# Patient Record
Sex: MALE | Race: WHITE | NOT HISPANIC OR LATINO
[De-identification: names, ages, dates, MRNs, and addresses within clinical notes are randomized per-mention and may not be internally consistent; named-entity substitution may affect disease eponyms.]

---

## 2021-08-14 ENCOUNTER — APPOINTMENT (OUTPATIENT)
Age: 41
End: 2021-08-14
Payer: COMMERCIAL

## 2021-08-14 PROCEDURE — 0001A: CPT

## 2021-09-18 ENCOUNTER — APPOINTMENT (OUTPATIENT)
Age: 41
End: 2021-09-18

## 2022-04-05 PROBLEM — Z00.00 ENCOUNTER FOR PREVENTIVE HEALTH EXAMINATION: Status: ACTIVE | Noted: 2022-04-05

## 2022-05-05 ENCOUNTER — APPOINTMENT (OUTPATIENT)
Dept: PAIN MANAGEMENT | Facility: CLINIC | Age: 42
End: 2022-05-05

## 2022-05-10 ENCOUNTER — APPOINTMENT (OUTPATIENT)
Dept: PAIN MANAGEMENT | Facility: CLINIC | Age: 42
End: 2022-05-10
Payer: OTHER MISCELLANEOUS

## 2022-05-10 VITALS — HEIGHT: 72 IN | BODY MASS INDEX: 33.86 KG/M2 | WEIGHT: 250 LBS

## 2022-05-10 PROCEDURE — 99072 ADDL SUPL MATRL&STAF TM PHE: CPT

## 2022-05-10 PROCEDURE — 99213 OFFICE O/P EST LOW 20 MIN: CPT

## 2022-05-10 NOTE — HISTORY OF PRESENT ILLNESS
[Lower back] : lower back [10] : 10 [8] : 8 [Radiating] : radiating [] : yes [Constant] : constant [Standing] : standing [Walking] : walking [FreeTextEntry1] : 05/10/22 - Patient presents for a WC FUV. Patient reports he was indicated for spine surgery surgery with Dr. Abarca and plans to move forward on 05/31/22. Patient reports minimal benefit with Gabapentin. Patient reports GI intolerance with NSAIDs. Patient presents today requesting rx therapies to offer relief in the interim. \par \par 2/21/22 - Patient presents for a FUV following a Right L5-S1 TFESI on 02/02/22. Patient reports no relief in his symptoms following his last injection. Patient reports that he has reconvened with Dr. Abarca who suggested proceeding with another MRI. Patient reports that driving exacerbates his pain. Patient has been taking Tylenol, gabapentin and flexural, finds mild relief with this combination, also experiencing dry mouth. \par \par 1/6/22 - Patient presents for initial evaluation. 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 He cannot any one specific event however. Denies any previous low back issues prior to this time. Patient c/o pain in his lower back, travelling in the right lower extremity focused on the right outer calf and top of the right foot. Patient was attending PT; however, he has not been able to gain approval to continue PT. \par \par W/c DOI: 03/26/21\par Occupation: [employer] \par Working Status: Light Duty\par \par Previous Injections: \par 1) Right L5-S1 TFESI (02/02/22)\par \par Pertinent Surgical History: N/A\par \par Imaging:\par 1) NCV / EMG (11/30/21) - [facility] \par Right sided mild L5 radiculopathy with acute and chronic denervation changes in the respective muscles / myotome. \par \par 2) Lumbar Spine MRI (03/07/22) - [facility]\par At L1-2: No significant spinal canal or neural foraminal stenosis.\par At L2-3: No significant spinal canal or neural foraminal stenosis.\par At L3-4: Broad-based left paracentral/foraminal disc herniation narrowing the left subarticular recess and moderately narrowing the adjacent foramen. The right neural foramen is patent. There is no significant spinal canal stenosis.\par At L4-5: Broad-based left paracentral/foraminal disc herniation effacing the ventral thecal sac without signal spinal canal stenosis. There is bilateral facet arthrosis contributing to moderate left-sided neural foraminal narrowing\par with abutment of the exiting L4 nerve root. The right neural foramen is patent.\par At L5-S1: Disc bulge with a superimposed broad-based left paracentral/foraminal disc herniation narrowing the left subarticular recess and severely narrowing the left neural foramen with impingement of the exiting L5 nerve root. There is mild bilateral facet arthrosis. The right neural foramen is patent. There is a central zone posterior annular fissure.\par \par Physician Disclaimer: I have personally reviewed and confirmed all HPI data with the patient. [FreeTextEntry7] : Right lateral calf, dorsal aspect of foot and ankle.

## 2022-05-10 NOTE — WORK
[Does not reveal pre-existing condition(s) that may affect treatment/prognosis] : does not reveal pre-existing condition(s) that may affect treatment/prognosis [Patient] : patient [No Rx restrictions] : No Rx restrictions. [I provided the services listed above] :  I provided the services listed above. [Partial] : partial [FreeTextEntry1] : fair

## 2022-05-10 NOTE — PHYSICAL EXAM
[de-identified] : Constitutional: \par - No acute distress \par - Well developed; well nourished \par \par Neurological: \par - normal mood and affect \par - alert and oriented x 3  \par \par Cardiovascular: \par - grossly normal\par \par Lumbar Spine Exam: \par \par Inspection: \par erythema (-) \par ecchymosis (-) \par rashes (-) \par alignment: no scoliosis\par \par Palpation:  \par paraspinal tenderness:                  L (-) ; R (+) \par thoracic paraspinal tenderness:    L (-) ; R (-) \par sciatic nerve tenderness :             L (-) ; R (+) \par SI joint tenderness:                        L (-) ; R (-) \par GTB tenderness:                            L (-);  R (-) \par \par ROM:   \par Full ROM with mild stiffness   \par Pain with extension and flexion \par \par Strength:                   Right       Left    \par Hip Flexion:                (5/5)       (5/5) \par Quadriceps:               (5/5)       (5/5) \par Hamstrings:                (5/5)       (5/5) \par Ankle Dorsiflexion:     (5/5)       (5/5) \par EHL:                            (5/5)       (5/5) \par Ankle Plantarflexion:  (5/5)       (5/5) \par \par \par Special Tests: \par SLR:                      R (+) ; L (-) \par Facet loading:       R (-) ; L (-) \par [place] test:          R (-) ; L (-) \par XSLR:                   R (-) ; L (-) \par Sarmad's test:        R (-) ; L(-) \par Tight Hamstrings   R (-) ; L (-) \par \par Neurologic: \par Light touch intact throughout LE \par Reflexes normal and symmetric \par \par Gait: \par non- antalgic gait

## 2022-05-10 NOTE — ASSESSMENT
[FreeTextEntry1] : A thorough discussion occurred regarding available pain management treatment options including interventional, rehabilitative, pharmacological, and alternative modalities with the patient. We will proceed with the following:\par \par Interventional treatment options:\par - Patient proceeding with lumbar surgical intervention with Dr. Abarca. \par - None indicated at this time\par \par Rehabilitative options:\par - completed PT; transition to HEP as tolerated\par \par Medication based treatment options:\par - increase gabapentin 600 mg TID ; titration schedule provided \par - Hold NSAIDs due to increasing GERD symptoms \par - continue Tylenol 500-1000 mg TID PRN \par - see additional instructions below\par \par Complementary treatment options:\par - Weight management and lifestyle modifications discussed\par \par Additional treatment recommendations as follows:\par - Follow up with Dr. Abarca as directed\par - follow up on PRN basis \par \par The documentation recorded by the scribe, in my presence, accurately reflects the service I personally performed and the decisions made by me with my edits as appropriate.\par \par I, Jessica Barbosa acting as scribe, attest that this documentation has been prepared under the direction and in the presence of Provider Roel Toribio DO.

## 2022-05-13 RX ORDER — CYCLOBENZAPRINE HYDROCHLORIDE 5 MG/1
5 TABLET, FILM COATED ORAL DAILY
Qty: 30 | Refills: 1 | Status: ACTIVE | COMMUNITY
Start: 2022-05-13 | End: 1900-01-01

## 2022-05-24 ENCOUNTER — APPOINTMENT (OUTPATIENT)
Dept: PHYSICAL MEDICINE AND REHAB | Facility: CLINIC | Age: 42
End: 2022-05-24
Payer: OTHER MISCELLANEOUS

## 2022-05-24 VITALS
BODY MASS INDEX: 33.86 KG/M2 | WEIGHT: 250 LBS | DIASTOLIC BLOOD PRESSURE: 88 MMHG | HEART RATE: 89 BPM | OXYGEN SATURATION: 100 % | TEMPERATURE: 98.1 F | SYSTOLIC BLOOD PRESSURE: 126 MMHG | HEIGHT: 72 IN | RESPIRATION RATE: 16 BRPM

## 2022-05-24 DIAGNOSIS — Z78.9 OTHER SPECIFIED HEALTH STATUS: ICD-10-CM

## 2022-05-24 DIAGNOSIS — K29.60 OTHER GASTRITIS W/OUT BLEEDING: ICD-10-CM

## 2022-05-24 DIAGNOSIS — Z80.0 FAMILY HISTORY OF MALIGNANT NEOPLASM OF DIGESTIVE ORGANS: ICD-10-CM

## 2022-05-24 PROCEDURE — 99072 ADDL SUPL MATRL&STAF TM PHE: CPT

## 2022-05-24 PROCEDURE — 99244 OFF/OP CNSLTJ NEW/EST MOD 40: CPT | Mod: NP,25

## 2022-05-24 PROCEDURE — 93000 ELECTROCARDIOGRAM COMPLETE: CPT

## 2022-05-24 RX ORDER — LORATADINE 10 MG
10 TABLET,CHEWABLE ORAL
Refills: 0 | Status: ACTIVE | COMMUNITY

## 2022-05-24 RX ORDER — MULTI VITAMIN WITH FLUORIDE .5; 2500; 24; 36; 400; 15; 1.05; 1.2; 13.5; 1.05; .3; 4.5 MG/1; [IU]/1; MG/1; MG/1; [IU]/1; [IU]/1; MG/1; MG/1; MG/1; MG/1; MG/1; UG/1
TABLET, CHEWABLE ORAL
Refills: 0 | Status: ACTIVE | COMMUNITY

## 2022-05-26 ENCOUNTER — FORM ENCOUNTER (OUTPATIENT)
Age: 42
End: 2022-05-26

## 2022-05-29 ENCOUNTER — TRANSCRIPTION ENCOUNTER (OUTPATIENT)
Age: 42
End: 2022-05-29

## 2022-05-31 ENCOUNTER — APPOINTMENT (OUTPATIENT)
Dept: ORTHOPEDIC SURGERY | Facility: HOSPITAL | Age: 42
End: 2022-05-31

## 2022-06-17 ENCOUNTER — APPOINTMENT (OUTPATIENT)
Dept: ORTHOPEDIC SURGERY | Facility: HOSPITAL | Age: 42
End: 2022-06-17

## 2022-06-20 RX ORDER — OXYCODONE AND ACETAMINOPHEN 5; 325 MG/1; MG/1
5-325 TABLET ORAL EVERY 6 HOURS
Qty: 28 | Refills: 0 | Status: ACTIVE | COMMUNITY
Start: 2022-06-20 | End: 1900-01-01

## 2022-06-24 RX ORDER — HYDROCODONE BITARTRATE AND ACETAMINOPHEN 5; 325 MG/1; MG/1
5-325 TABLET ORAL
Qty: 30 | Refills: 0 | Status: ACTIVE | COMMUNITY
Start: 2022-06-24 | End: 1900-01-01

## 2022-06-27 ENCOUNTER — FORM ENCOUNTER (OUTPATIENT)
Age: 42
End: 2022-06-27

## 2022-06-29 ENCOUNTER — FORM ENCOUNTER (OUTPATIENT)
Age: 42
End: 2022-06-29

## 2022-06-29 ENCOUNTER — APPOINTMENT (OUTPATIENT)
Dept: ORTHOPEDIC SURGERY | Facility: CLINIC | Age: 42
End: 2022-06-29
Payer: OTHER MISCELLANEOUS

## 2022-06-29 VITALS — WEIGHT: 250 LBS | HEIGHT: 72 IN | BODY MASS INDEX: 33.86 KG/M2

## 2022-06-29 PROCEDURE — 99024 POSTOP FOLLOW-UP VISIT: CPT

## 2022-06-29 RX ORDER — METHYLPREDNISOLONE 4 MG/1
4 TABLET ORAL
Qty: 1 | Refills: 0 | Status: ACTIVE | COMMUNITY
Start: 2022-06-29 | End: 1900-01-01

## 2022-06-29 RX ORDER — OXYCODONE AND ACETAMINOPHEN 5; 325 MG/1; MG/1
5-325 TABLET ORAL
Qty: 30 | Refills: 0 | Status: ACTIVE | COMMUNITY
Start: 2022-06-29 | End: 1900-01-01

## 2022-06-29 NOTE — HISTORY OF PRESENT ILLNESS
[de-identified] : S/P Laminectomy L4-5, L5-S1 Bilateral Foraminotomy 6/17/22. Denies fevers, chiills, SOB. Pain is 7/10. Taking 2 Percocet a day QHS and one during the day, Gabapentin 600 mg TID, Flexeril 5 mg TID, and Tylenol. Patient reports that he still has pain in bilateral legs, most notably in anterior right shin and top of right foot.

## 2022-06-29 NOTE — REASON FOR VISIT
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

## 2022-06-29 NOTE — ASSESSMENT
[FreeTextEntry1] : Rx medrol dose santana\par Rx Percocet 5-325 mg q6 hrs PRN for pain\par Will defer PT at this time\par Perform stretching at home at this time\par Recommend: - NSAID - Heating pad - Muscle relaxer - Core strengthening exercise - Hamstring stretching exercise Patient is given back rehabilitation exercise book.\par

## 2022-06-30 ENCOUNTER — FORM ENCOUNTER (OUTPATIENT)
Age: 42
End: 2022-06-30

## 2022-07-04 ENCOUNTER — FORM ENCOUNTER (OUTPATIENT)
Age: 42
End: 2022-07-04

## 2022-07-05 RX ORDER — CYCLOBENZAPRINE HYDROCHLORIDE 5 MG/1
5 TABLET, FILM COATED ORAL 3 TIMES DAILY
Qty: 90 | Refills: 2 | Status: ACTIVE | COMMUNITY
Start: 2022-07-05 | End: 1900-01-01

## 2022-07-06 ENCOUNTER — FORM ENCOUNTER (OUTPATIENT)
Age: 42
End: 2022-07-06

## 2022-07-07 ENCOUNTER — FORM ENCOUNTER (OUTPATIENT)
Age: 42
End: 2022-07-07

## 2022-07-13 RX ORDER — METHYLPREDNISOLONE 4 MG/1
4 TABLET ORAL
Qty: 1 | Refills: 0 | Status: ACTIVE | COMMUNITY
Start: 2022-07-13 | End: 1900-01-01

## 2022-07-24 ENCOUNTER — FORM ENCOUNTER (OUTPATIENT)
Age: 42
End: 2022-07-24

## 2022-07-25 RX ORDER — GABAPENTIN 600 MG/1
600 TABLET, COATED ORAL 3 TIMES DAILY
Qty: 90 | Refills: 0 | Status: ACTIVE | COMMUNITY
Start: 2022-07-25 | End: 1900-01-01

## 2022-07-26 ENCOUNTER — FORM ENCOUNTER (OUTPATIENT)
Age: 42
End: 2022-07-26

## 2022-07-28 ENCOUNTER — FORM ENCOUNTER (OUTPATIENT)
Age: 42
End: 2022-07-28

## 2022-08-03 ENCOUNTER — APPOINTMENT (OUTPATIENT)
Dept: ORTHOPEDIC SURGERY | Facility: CLINIC | Age: 42
End: 2022-08-03

## 2022-08-03 VITALS — WEIGHT: 250 LBS | BODY MASS INDEX: 33.86 KG/M2 | HEIGHT: 72 IN

## 2022-08-03 PROCEDURE — 99024 POSTOP FOLLOW-UP VISIT: CPT

## 2022-08-03 PROCEDURE — 72100 X-RAY EXAM L-S SPINE 2/3 VWS: CPT

## 2022-08-07 ENCOUNTER — FORM ENCOUNTER (OUTPATIENT)
Age: 42
End: 2022-08-07

## 2022-08-11 NOTE — ASSESSMENT
[FreeTextEntry1] : Patient will begin physical therapy. \par \par Recommend: - NSAID - Heating pad - Muscle relaxer - Core strengthening exercise - Hamstring stretching exercise Patient is given back rehabilitation exercise book. \par \par Begin Mobic PRN\par \par Patient given RX for LSO brace\par \par Follow up in 1 month \par

## 2022-08-11 NOTE — HISTORY OF PRESENT ILLNESS
[de-identified] : S/P Laminectomy L4-5, L5-S1 Bilateral Foraminotomy 6/17/22. Experiencing constant back. Unable to sit, stand, or walk for long than 30 minutes. Finished Medrol Pack. Taking 2 Percocet BID, Gabapentin 600 mg TID, Flexeril 5 mg TID, and Tylenol.

## 2022-08-26 ENCOUNTER — APPOINTMENT (OUTPATIENT)
Dept: ORTHOPEDIC SURGERY | Facility: CLINIC | Age: 42
End: 2022-08-26

## 2022-08-26 VITALS — WEIGHT: 250 LBS | HEIGHT: 72 IN | BODY MASS INDEX: 33.86 KG/M2

## 2022-08-26 PROCEDURE — 99024 POSTOP FOLLOW-UP VISIT: CPT

## 2022-08-26 NOTE — ASSESSMENT
[FreeTextEntry1] : Patient started PT today. \par \par Was unable to get brace yet due to payment issues from .  Gave patient phone number for [facility].\par \par Worker's compensation 50% temporarily disabled \par \par Recommend: - NSAID - Heating pad - Muscle relaxer - Core strengthening exercise - Hamstring stretching exercise Patient is given back rehabilitation exercise book.

## 2022-08-26 NOTE — HISTORY OF PRESENT ILLNESS
[de-identified] : S/P Laminectomy L4-5, L5-S1 Bilateral Foraminotomy 6/17/22. Still experiencing constant pain. Started PT today. Taking 2 Percocet, Gabapentin 600 mg TID, Flexeril 5 mg TID, Meloxicam, and Tylenol. Unsuccessful at finding a place to get the LSO brace.

## 2022-08-30 RX ORDER — CYCLOBENZAPRINE HYDROCHLORIDE 10 MG/1
10 TABLET, FILM COATED ORAL 3 TIMES DAILY
Qty: 90 | Refills: 2 | Status: ACTIVE | COMMUNITY
Start: 2022-07-13 | End: 1900-01-01

## 2022-08-31 ENCOUNTER — FORM ENCOUNTER (OUTPATIENT)
Age: 42
End: 2022-08-31

## 2022-09-07 ENCOUNTER — FORM ENCOUNTER (OUTPATIENT)
Age: 42
End: 2022-09-07

## 2022-09-21 ENCOUNTER — FORM ENCOUNTER (OUTPATIENT)
Age: 42
End: 2022-09-21

## 2022-09-21 ENCOUNTER — APPOINTMENT (OUTPATIENT)
Dept: ORTHOPEDIC SURGERY | Facility: CLINIC | Age: 42
End: 2022-09-21

## 2022-09-21 PROCEDURE — 99072 ADDL SUPL MATRL&STAF TM PHE: CPT

## 2022-09-21 PROCEDURE — 99215 OFFICE O/P EST HI 40 MIN: CPT

## 2022-09-21 NOTE — HISTORY OF PRESENT ILLNESS
[de-identified] : S/P Laminectomy L4-5, L5-S1 Bilateral Foraminotomy 6/17/22. \par Patient reports increased pain in back and legs since last visit\par Reports going to PT 2-3x/week (10 sessions) with no improvement. \par Pt states "each day is worse than the day before" \par Reports taking Gabapentin, Mobic, Flexeril, Tylenol and Percocet with slight relief \par Pt received LSO brace- reports feeling stabilized when walking but no improvement in pain.

## 2022-09-21 NOTE — ASSESSMENT
[FreeTextEntry1] : Patient given prescription for MRI, follow up after study is completed to discuss results. \par \par Continue using LSO brace PRN\par \par Recommend: - NSAID - Heating pad - Muscle relaxer - Core strengthening exercise - Hamstring stretching exercise Patient is given back rehabilitation exercise book. \par \par Patient will go to PT tomorrow.  If having increased pain will hold until MRI is performed. \par \par Worker's compensation 50% temporarily disabled

## 2022-09-22 ENCOUNTER — FORM ENCOUNTER (OUTPATIENT)
Age: 42
End: 2022-09-22

## 2022-09-26 RX ORDER — CYCLOBENZAPRINE HYDROCHLORIDE 5 MG/1
5 TABLET, FILM COATED ORAL
Qty: 180 | Refills: 1 | Status: ACTIVE | COMMUNITY
Start: 2022-06-27 | End: 1900-01-01

## 2022-10-05 ENCOUNTER — FORM ENCOUNTER (OUTPATIENT)
Age: 42
End: 2022-10-05

## 2022-10-13 ENCOUNTER — APPOINTMENT (OUTPATIENT)
Dept: ORTHOPEDIC SURGERY | Facility: CLINIC | Age: 42
End: 2022-10-13

## 2022-10-13 VITALS — HEIGHT: 72 IN | WEIGHT: 250 LBS | BODY MASS INDEX: 33.86 KG/M2

## 2022-10-13 PROCEDURE — 99215 OFFICE O/P EST HI 40 MIN: CPT

## 2022-10-13 PROCEDURE — 72100 X-RAY EXAM L-S SPINE 2/3 VWS: CPT

## 2022-10-13 PROCEDURE — 99072 ADDL SUPL MATRL&STAF TM PHE: CPT

## 2022-10-13 NOTE — ASSESSMENT
[FreeTextEntry1] : Patient is scheduled for lumbar MRI with contrast tomorrow. \par \par Continue using LSO brace PRN\par \par Recommend: - NSAID - Heating pad - Muscle relaxer - Core strengthening exercise - Hamstring stretching exercise Patient is given back rehabilitation exercise book. \par \par Continue PT\par \par Worker's compensation 50% temporarily disabled

## 2022-10-13 NOTE — HISTORY OF PRESENT ILLNESS
[de-identified] : Follow up lumbar spine MRI Results at doctor's office (has disc). Experiencing constant pain interfering with daily activities. Discontinued PT due to pain and swelling. Taking Gabapentin, Mobic, Flexeril, Tylenol and Percocet with slight relief. Wearing LSO brace.

## 2022-10-16 ENCOUNTER — FORM ENCOUNTER (OUTPATIENT)
Age: 42
End: 2022-10-16

## 2022-10-19 ENCOUNTER — APPOINTMENT (OUTPATIENT)
Dept: ORTHOPEDIC SURGERY | Facility: CLINIC | Age: 42
End: 2022-10-19

## 2022-10-19 PROCEDURE — 99213 OFFICE O/P EST LOW 20 MIN: CPT | Mod: 95

## 2022-10-25 ENCOUNTER — FORM ENCOUNTER (OUTPATIENT)
Age: 42
End: 2022-10-25

## 2022-11-22 ENCOUNTER — RX RENEWAL (OUTPATIENT)
Age: 42
End: 2022-11-22

## 2022-11-30 ENCOUNTER — APPOINTMENT (OUTPATIENT)
Dept: ORTHOPEDIC SURGERY | Facility: CLINIC | Age: 42
End: 2022-11-30

## 2022-11-30 VITALS — HEIGHT: 72 IN | BODY MASS INDEX: 33.86 KG/M2 | WEIGHT: 250 LBS

## 2022-11-30 PROCEDURE — 99072 ADDL SUPL MATRL&STAF TM PHE: CPT

## 2022-11-30 PROCEDURE — 99215 OFFICE O/P EST HI 40 MIN: CPT

## 2022-11-30 RX ORDER — ESOMEPRAZOLE MAGNESIUM 20 MG/1
20 CAPSULE, DELAYED RELEASE ORAL
Refills: 0 | Status: ACTIVE | COMMUNITY

## 2022-11-30 NOTE — DISCUSSION/SUMMARY
[Surgical risks reviewed] : Surgical risks reviewed [de-identified] : I discussed non operative and operative treatment options in great detail with the patient. I discussed treatment options, including but not limited to,\par 1. Non operative treatment - rest, NSAID, physical therapy etc.\par 2. Interventional treatment - injections etc.\par 3. Surgical treatment - Facetectomy and instrumented fusion L4-5, 5-S1. \par \par Patient wants to proceed with surgical intervention after failing nonoperative care. I had a lengthy discussion with the patient about the rational and goal of the surgery as well as expected outcome. Patient will need facetectomy to decompress spinal canal and nerve root, and will require instrumented fusion for stability. I encouraged patient to seek a second opinion regarding surgery. I explained postoperative rehabilitation and recovery process to the patient. I discussed risks, benefits and alternatives of the procedure in detail with the patient. I counseled patient about risks and possible complications, including but not limited to, infection, bleeding, nerve injury, arterial and venous injury, single or multiple muscle group weakness, dural tear, CSF leak, pseudomeningocele, arachnoiditis, CSF fistula, meningitis, discitis, osteomyelitis, epidural hematoma, DVT, PE, CRPS, MI, paralysis, pseudoarthrosis, instrumentation malposition, adjacent segment disease, persistent symptoms, and risks of anesthesia. I explained to the patient that the surgical outcome is unpredictable and there is no guarantee that the symptoms will resolve after the surgery. The patient understands and wishes to proceed. All questions were answered and patient was given information to review.

## 2022-11-30 NOTE — ASSESSMENT
[FreeTextEntry1] : \par Continue using LSO brace PRN\par \par Recommend: - NSAID - Heating pad - Muscle relaxer - Core strengthening exercise - Hamstring stretching exercise Patient is given back rehabilitation exercise book. \par \par Continue HEP\par \par Worker's compensation 50% temporarily disabled

## 2022-11-30 NOTE — HISTORY OF PRESENT ILLNESS
[de-identified] : Follow up lumbar spine. Experiencing constant pain radiating down bilateral legs, has numbness/tingling in the right leg. Taking Gabapentin, Mobic, Flexeril, Tylenol and Percocet with slight relief. Admits that Percocet reduces his pain by at least 30%. Wearing LSO brace.

## 2022-12-11 ENCOUNTER — FORM ENCOUNTER (OUTPATIENT)
Age: 42
End: 2022-12-11

## 2022-12-19 ENCOUNTER — RX RENEWAL (OUTPATIENT)
Age: 42
End: 2022-12-19

## 2022-12-20 ENCOUNTER — FORM ENCOUNTER (OUTPATIENT)
Age: 42
End: 2022-12-20

## 2022-12-21 ENCOUNTER — FORM ENCOUNTER (OUTPATIENT)
Age: 42
End: 2022-12-21

## 2023-01-17 ENCOUNTER — RX RENEWAL (OUTPATIENT)
Age: 43
End: 2023-01-17

## 2023-01-24 ENCOUNTER — APPOINTMENT (OUTPATIENT)
Dept: PHYSICAL MEDICINE AND REHAB | Facility: CLINIC | Age: 43
End: 2023-01-24
Payer: OTHER MISCELLANEOUS

## 2023-01-24 ENCOUNTER — FORM ENCOUNTER (OUTPATIENT)
Age: 43
End: 2023-01-24

## 2023-01-24 VITALS
SYSTOLIC BLOOD PRESSURE: 126 MMHG | WEIGHT: 250 LBS | TEMPERATURE: 97.9 F | HEIGHT: 72 IN | RESPIRATION RATE: 16 BRPM | OXYGEN SATURATION: 99 % | DIASTOLIC BLOOD PRESSURE: 80 MMHG | BODY MASS INDEX: 33.86 KG/M2 | HEART RATE: 79 BPM

## 2023-01-24 DIAGNOSIS — Z01.818 ENCOUNTER FOR OTHER PREPROCEDURAL EXAMINATION: ICD-10-CM

## 2023-01-24 PROCEDURE — 99244 OFF/OP CNSLTJ NEW/EST MOD 40: CPT | Mod: ACP,25

## 2023-01-24 PROCEDURE — 99072 ADDL SUPL MATRL&STAF TM PHE: CPT

## 2023-01-24 PROCEDURE — 93010 ELECTROCARDIOGRAM REPORT: CPT

## 2023-01-24 NOTE — PHYSICAL EXAM
[No Acute Distress] : no acute distress [Well Nourished] : well nourished [Well Developed] : well developed [Well-Appearing] : well-appearing [Normal Sclera/Conjunctiva] : normal sclera/conjunctiva [PERRL] : pupils equal round and reactive to light [EOMI] : extraocular movements intact [Normal Outer Ear/Nose] : the outer ears and nose were normal in appearance [Normal Oropharynx] : the oropharynx was normal [No JVD] : no jugular venous distention [No Lymphadenopathy] : no lymphadenopathy [Supple] : supple [Thyroid Normal, No Nodules] : the thyroid was normal and there were no nodules present [No Respiratory Distress] : no respiratory distress  [No Accessory Muscle Use] : no accessory muscle use [Clear to Auscultation] : lungs were clear to auscultation bilaterally [Normal Rate] : normal rate  [Regular Rhythm] : with a regular rhythm [Normal S1, S2] : normal S1 and S2 [No Carotid Bruits] : no carotid bruits [No Abdominal Bruit] : a ~M bruit was not heard ~T in the abdomen [No Varicosities] : no varicosities [Pedal Pulses Present] : the pedal pulses are present [No Edema] : there was no peripheral edema [No Palpable Aorta] : no palpable aorta [No Extremity Clubbing/Cyanosis] : no extremity clubbing/cyanosis [Soft] : abdomen soft [Non Tender] : non-tender [Non-distended] : non-distended [No Masses] : no abdominal mass palpated [No HSM] : no HSM [Normal Bowel Sounds] : normal bowel sounds [Normal Posterior Cervical Nodes] : no posterior cervical lymphadenopathy [Normal Anterior Cervical Nodes] : no anterior cervical lymphadenopathy [No CVA Tenderness] : no CVA  tenderness [No Spinal Tenderness] : no spinal tenderness [No Joint Swelling] : no joint swelling [Grossly Normal Strength/Tone] : grossly normal strength/tone [No Rash] : no rash [Coordination Grossly Intact] : coordination grossly intact [No Focal Deficits] : no focal deficits [Normal Gait] : normal gait [Deep Tendon Reflexes (DTR)] : deep tendon reflexes were 2+ and symmetric [Normal Affect] : the affect was normal [Normal Insight/Judgement] : insight and judgment were intact [de-identified] : murmur [de-identified] : back pain and tenderness noted on exam

## 2023-01-24 NOTE — PLAN
[FreeTextEntry1] : EKG - NSR - 79, MARY - 0.132, No acute T wave changes noted..\par \par Labs reviewed\par \par Patient continue medications as discussed\par Increase fluid\par \par Patient is medically optimized at this time, and may proceed with proposed procedure.\par \par I, Glynn Becker , attest that this documentation has been prepared under the direction and in the presence of Provider Jovany Garrett DNP\par The documentation recorded by the scribe, in my presence, accurately reflects the service I personally performed, and the decisions made by me with my edits as appropriate.\par Jovany Garrett DNP\par \par Addendum 6/15/2022\par Patient tested positive for COVID -19 5/28/22.  He has been symptoms free since testing positive and at this time is exempt for testing for the next 90 days.  He is able to proceed with proposed procedure at this time without any restrictions.\par

## 2023-01-24 NOTE — HISTORY OF PRESENT ILLNESS
[FreeTextEntry1] : medical clearance  [de-identified] : Patient presents in office today for medical clearance requested by  for laminectomy for injury to L4-S1 disc. Patient states while at work he injured his back . Patient is currently having lower back pain at this time. Patient has little relief with the help of OTC medications, prescription medications, trigger-point injections, and PT.Denies any CP, SOB or diff breathing. No recent fever, chills, cough or cold type symptoms. Has no other complaints at this time.

## 2023-01-26 ENCOUNTER — FORM ENCOUNTER (OUTPATIENT)
Age: 43
End: 2023-01-26

## 2023-01-26 NOTE — HISTORY OF PRESENT ILLNESS
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 Patient states while at work he injured his back . Patient is currently having severe lower back pain at this time. Patient has little relief with the help of OTC medications, prescription medications, trigger-point injections, and PT.Denies any CP, SOB or diff breathing. No recent fever, chills, cough or cold type symptoms. Has no other complaints at this time.

## 2023-01-26 NOTE — PHYSICAL EXAM
[No Acute Distress] : no acute distress [Well Nourished] : well nourished [Well Developed] : well developed [Well-Appearing] : well-appearing [Normal Sclera/Conjunctiva] : normal sclera/conjunctiva [PERRL] : pupils equal round and reactive to light [EOMI] : extraocular movements intact [Normal Outer Ear/Nose] : the outer ears and nose were normal in appearance [Normal Oropharynx] : the oropharynx was normal [No JVD] : no jugular venous distention [No Lymphadenopathy] : no lymphadenopathy [Supple] : supple [Thyroid Normal, No Nodules] : the thyroid was normal and there were no nodules present [No Respiratory Distress] : no respiratory distress  [No Accessory Muscle Use] : no accessory muscle use [Clear to Auscultation] : lungs were clear to auscultation bilaterally [Normal Rate] : normal rate  [Regular Rhythm] : with a regular rhythm [Normal S1, S2] : normal S1 and S2 [No Murmur] : no murmur heard [No Carotid Bruits] : no carotid bruits [No Abdominal Bruit] : a ~M bruit was not heard ~T in the abdomen [No Varicosities] : no varicosities [Pedal Pulses Present] : the pedal pulses are present [No Edema] : there was no peripheral edema [No Palpable Aorta] : no palpable aorta [No Extremity Clubbing/Cyanosis] : no extremity clubbing/cyanosis [Soft] : abdomen soft [Non Tender] : non-tender [Non-distended] : non-distended [No Masses] : no abdominal mass palpated [No HSM] : no HSM [Normal Bowel Sounds] : normal bowel sounds [Normal Posterior Cervical Nodes] : no posterior cervical lymphadenopathy [Normal Anterior Cervical Nodes] : no anterior cervical lymphadenopathy [No Joint Swelling] : no joint swelling [Grossly Normal Strength/Tone] : grossly normal strength/tone [No Rash] : no rash [Coordination Grossly Intact] : coordination grossly intact [No Focal Deficits] : no focal deficits [Normal Gait] : normal gait [Deep Tendon Reflexes (DTR)] : deep tendon reflexes were 2+ and symmetric [Normal Affect] : the affect was normal [Normal Insight/Judgement] : insight and judgment were intact [de-identified] : Lumbar pain

## 2023-01-26 NOTE — PLAN
[FreeTextEntry1] : EKG - NSR - 100, MARY - 0.148, No acute T wave changes noted.. \par \par Labs reviewed - A1C = 5.4\par \par Patient to continue with present medications – all medications reconciled/reviewed during this visit and listed above. \par Increase fluid intake.. \par \par Patient is Medically optimized at this time and may proceed with proposed procedure.\par \par This clearance has been forwarded to Dr. Abarca and his office has been contacted providing medical clearance and all associated documents for this patient.\par

## 2023-01-29 ENCOUNTER — NON-APPOINTMENT (OUTPATIENT)
Age: 43
End: 2023-01-29

## 2023-01-31 ENCOUNTER — FORM ENCOUNTER (OUTPATIENT)
Age: 43
End: 2023-01-31

## 2023-02-01 RX ORDER — OXYCODONE AND ACETAMINOPHEN 5; 325 MG/1; MG/1
5-325 TABLET ORAL
Qty: 30 | Refills: 0 | Status: ACTIVE | COMMUNITY
Start: 2022-06-27 | End: 1900-01-01

## 2023-02-03 ENCOUNTER — APPOINTMENT (OUTPATIENT)
Dept: ORTHOPEDIC SURGERY | Facility: HOSPITAL | Age: 43
End: 2023-02-03
Payer: OTHER MISCELLANEOUS

## 2023-02-03 PROCEDURE — 22614 ARTHRD PST TQ 1NTRSPC EA ADD: CPT

## 2023-02-03 PROCEDURE — 61783 SCAN PROC SPINAL: CPT

## 2023-02-03 PROCEDURE — 22614 ARTHRD PST TQ 1NTRSPC EA ADD: CPT | Mod: AS

## 2023-02-03 PROCEDURE — 61783 SCAN PROC SPINAL: CPT | Mod: AS

## 2023-02-03 PROCEDURE — 22633 ARTHRD CMBN 1NTRSPC LUMBAR: CPT

## 2023-02-03 PROCEDURE — 69990 MICROSURGERY ADD-ON: CPT

## 2023-02-03 PROCEDURE — 63048 LAM FACETEC &FORAMOT EA ADDL: CPT | Mod: AS

## 2023-02-03 PROCEDURE — 76000 FLUOROSCOPY <1 HR PHYS/QHP: CPT | Mod: 26,59

## 2023-02-03 PROCEDURE — 69990 MICROSURGERY ADD-ON: CPT | Mod: AS

## 2023-02-03 PROCEDURE — 63047 LAM FACETEC & FORAMOT LUMBAR: CPT

## 2023-02-03 PROCEDURE — 22633 ARTHRD CMBN 1NTRSPC LUMBAR: CPT | Mod: AS

## 2023-02-03 PROCEDURE — 22842 INSERT SPINE FIXATION DEVICE: CPT

## 2023-02-03 PROCEDURE — 20930 SP BONE ALGRFT MORSEL ADD-ON: CPT

## 2023-02-03 PROCEDURE — 22842 INSERT SPINE FIXATION DEVICE: CPT | Mod: AS

## 2023-02-03 PROCEDURE — 20936 SP BONE AGRFT LOCAL ADD-ON: CPT

## 2023-02-03 PROCEDURE — 22853 INSJ BIOMECHANICAL DEVICE: CPT | Mod: AS

## 2023-02-03 PROCEDURE — 22853 INSJ BIOMECHANICAL DEVICE: CPT

## 2023-02-03 PROCEDURE — 63048 LAM FACETEC &FORAMOT EA ADDL: CPT

## 2023-02-03 PROCEDURE — 63047 LAM FACETEC & FORAMOT LUMBAR: CPT | Mod: AS

## 2023-02-16 ENCOUNTER — APPOINTMENT (OUTPATIENT)
Dept: ORTHOPEDIC SURGERY | Facility: CLINIC | Age: 43
End: 2023-02-16
Payer: OTHER MISCELLANEOUS

## 2023-02-16 VITALS — WEIGHT: 250 LBS | BODY MASS INDEX: 33.86 KG/M2 | HEIGHT: 72 IN

## 2023-02-16 PROCEDURE — 72100 X-RAY EXAM L-S SPINE 2/3 VWS: CPT

## 2023-02-16 PROCEDURE — 99024 POSTOP FOLLOW-UP VISIT: CPT

## 2023-02-16 RX ORDER — HYDROMORPHONE HYDROCHLORIDE 2 MG/1
2 TABLET ORAL EVERY 6 HOURS
Qty: 28 | Refills: 0 | Status: ACTIVE | COMMUNITY
Start: 2023-02-16 | End: 1900-01-01

## 2023-02-16 NOTE — ASSESSMENT
[FreeTextEntry1] : \par Worker's compensation 100% temporarily disabled\par \par No bending lifting twisting or driving.  Keep using LSO brace when ambulating.\par \par Follow up in 6 weeks

## 2023-02-16 NOTE — HISTORY OF PRESENT ILLNESS
[de-identified] : S/P Revision Laminectomy, Facetectomy, Instrumented Fusion L4-5, L5-S1 2/3/23. Denies fevers, chills, SOB. Pain is 8/10. Taking Gabapentin, Flexeril, Hydromorphone, Oxycodone XR, and Tylenol. Using walker for ambulation.  PAST MEDICAL HISTORY:  Asthma     Bipolar disorder     Diabetes     Endometriosis     Factor V Leiden     GERD (gastroesophageal reflux disease)     History of DVT in adulthood RLE on eliquis    HTN (hypertension)     Hypothyroid     Insomnia     Seasonal allergies     Seizure

## 2023-03-29 ENCOUNTER — APPOINTMENT (OUTPATIENT)
Dept: ORTHOPEDIC SURGERY | Facility: CLINIC | Age: 43
End: 2023-03-29
Payer: OTHER MISCELLANEOUS

## 2023-03-29 VITALS — BODY MASS INDEX: 33.86 KG/M2 | WEIGHT: 250 LBS | HEIGHT: 72 IN

## 2023-03-29 DIAGNOSIS — Z98.890 OTHER SPECIFIED POSTPROCEDURAL STATES: ICD-10-CM

## 2023-03-29 PROCEDURE — 72100 X-RAY EXAM L-S SPINE 2/3 VWS: CPT

## 2023-03-29 PROCEDURE — 99024 POSTOP FOLLOW-UP VISIT: CPT

## 2023-03-29 RX ORDER — OXYCODONE AND ACETAMINOPHEN 10; 325 MG/1; MG/1
10-325 TABLET ORAL 3 TIMES DAILY
Qty: 42 | Refills: 0 | Status: ACTIVE | COMMUNITY
Start: 2023-02-16 | End: 1900-01-01

## 2023-03-29 RX ORDER — TIZANIDINE 4 MG/1
4 TABLET ORAL EVERY 8 HOURS
Qty: 180 | Refills: 0 | Status: ACTIVE | COMMUNITY
Start: 2023-03-29 | End: 1900-01-01

## 2023-03-29 NOTE — ASSESSMENT
[FreeTextEntry1] : Worker's compensation 100% temporarily disabled\par \par Patient will begin physical therapy. \par \par Recommend: - NSAID - Heating pad - Muscle relaxer - Core strengthening exercise - Hamstring stretching exercise Patient is given back rehabilitation exercise book. \par \par Follow up in 2 months

## 2023-03-29 NOTE — HISTORY OF PRESENT ILLNESS
[de-identified] : S/P Revision Laminectomy, Facetectomy, Instrumented Fusion L4-5, L5-S1 2/3/23. States he has a lot of pain in the back and bilateral feet. Taking Gabapentin, Flexeril, Oxycodone XR, and Tylenol. States her ran out of Hydromorphone. Wearing LSO brace.

## 2023-04-12 RX ORDER — OXYCODONE AND ACETAMINOPHEN 5; 325 MG/1; MG/1
5-325 TABLET ORAL EVERY 6 HOURS
Qty: 30 | Refills: 0 | Status: ACTIVE | COMMUNITY
Start: 2022-07-25 | End: 1900-01-01

## 2023-04-16 ENCOUNTER — FORM ENCOUNTER (OUTPATIENT)
Age: 43
End: 2023-04-16

## 2023-04-17 ENCOUNTER — RX RENEWAL (OUTPATIENT)
Age: 43
End: 2023-04-17

## 2023-04-21 RX ORDER — OXYCODONE AND ACETAMINOPHEN 10; 325 MG/1; MG/1
10-325 TABLET ORAL EVERY 6 HOURS
Qty: 28 | Refills: 0 | Status: ACTIVE | COMMUNITY
Start: 2023-04-12 | End: 1900-01-01

## 2023-04-28 RX ORDER — GABAPENTIN 600 MG/1
600 TABLET, COATED ORAL 3 TIMES DAILY
Qty: 90 | Refills: 1 | Status: ACTIVE | COMMUNITY
Start: 2023-04-28 | End: 1900-01-01

## 2023-05-07 ENCOUNTER — FORM ENCOUNTER (OUTPATIENT)
Age: 43
End: 2023-05-07

## 2023-05-17 ENCOUNTER — FORM ENCOUNTER (OUTPATIENT)
Age: 43
End: 2023-05-17

## 2023-05-22 ENCOUNTER — FORM ENCOUNTER (OUTPATIENT)
Age: 43
End: 2023-05-22

## 2023-05-24 ENCOUNTER — FORM ENCOUNTER (OUTPATIENT)
Age: 43
End: 2023-05-24

## 2023-05-31 ENCOUNTER — APPOINTMENT (OUTPATIENT)
Dept: ORTHOPEDIC SURGERY | Facility: CLINIC | Age: 43
End: 2023-05-31
Payer: OTHER MISCELLANEOUS

## 2023-05-31 ENCOUNTER — FORM ENCOUNTER (OUTPATIENT)
Age: 43
End: 2023-05-31

## 2023-05-31 VITALS — BODY MASS INDEX: 33.86 KG/M2 | WEIGHT: 250 LBS | HEIGHT: 72 IN

## 2023-05-31 PROCEDURE — 99215 OFFICE O/P EST HI 40 MIN: CPT

## 2023-05-31 PROCEDURE — 72100 X-RAY EXAM L-S SPINE 2/3 VWS: CPT

## 2023-05-31 NOTE — ASSESSMENT
[FreeTextEntry1] : Worker's compensation 50% temporarily disabled.  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  No bending lifting or twisting.  patient can only work 4 hours a day max, 5 days a week. \par \par Patient will continue physical therapy. \par \par D/c gabapentin, begin Lyrica \par \par Recommend: - NSAID - Heating pad - Muscle relaxer - Core strengthening exercise - Hamstring stretching exercise Patient is given back rehabilitation exercise book. \par \par Follow up in 2 months

## 2023-05-31 NOTE — HISTORY OF PRESENT ILLNESS
[de-identified] : S/P Revision Laminectomy, Facetectomy, Instrumented Fusion L4-5, L5-S1 2/3/23. States he has constant pain, tightness, and discomfort. States he has numbness in bilateral feet. States he has pain is bilateral ankles. Admits to going to PT 2x a week with some relief. Admits to taking Gabapentin, Flexeril, Mobic, Oxycodone XR, and Tylenol. Wearing LSO brace PRN.

## 2023-06-01 ENCOUNTER — FORM ENCOUNTER (OUTPATIENT)
Age: 43
End: 2023-06-01

## 2023-06-08 ENCOUNTER — FORM ENCOUNTER (OUTPATIENT)
Age: 43
End: 2023-06-08

## 2023-06-09 RX ORDER — MELOXICAM 7.5 MG/1
7.5 TABLET ORAL TWICE DAILY
Qty: 60 | Refills: 1 | Status: ACTIVE | COMMUNITY
Start: 2022-08-03 | End: 1900-01-01

## 2023-06-12 ENCOUNTER — FORM ENCOUNTER (OUTPATIENT)
Age: 43
End: 2023-06-12

## 2023-06-14 ENCOUNTER — FORM ENCOUNTER (OUTPATIENT)
Age: 43
End: 2023-06-14

## 2023-06-22 ENCOUNTER — APPOINTMENT (OUTPATIENT)
Dept: ORTHOPEDIC SURGERY | Facility: CLINIC | Age: 43
End: 2023-06-22
Payer: OTHER MISCELLANEOUS

## 2023-06-22 VITALS — BODY MASS INDEX: 33.86 KG/M2 | HEIGHT: 72 IN | WEIGHT: 250 LBS

## 2023-06-22 PROCEDURE — 72100 X-RAY EXAM L-S SPINE 2/3 VWS: CPT

## 2023-06-22 PROCEDURE — 99215 OFFICE O/P EST HI 40 MIN: CPT

## 2023-06-22 NOTE — ASSESSMENT
[FreeTextEntry1] : We attempted to send patient back to work light duty.  however his physical therapy was not approved, and he regressed with worsening back pain and loss of motion.  Patient has difficulty standing more than 2 minutes at a time. \par \par Worker's compensation 100% temporarily disabled \par \par Will submit auth for bone stimulator.\par \par Lyrica denied, will resubmit  \par \par Recommend: - NSAID - Heating pad - Muscle relaxer - Core strengthening exercise - Hamstring stretching exercise Patient is given back rehabilitation exercise book. \par \par Follow up in 2 months

## 2023-06-22 NOTE — IMAGING
[Implants in position] : Implants in position [No loosening of hardware] : No loosening of hardware [FreeTextEntry1] : No posterior lateral fusion mass noted

## 2023-06-22 NOTE — HISTORY OF PRESENT ILLNESS
[de-identified] : S/P Revision Laminectomy, Facetectomy, Instrumented Fusion L4-5, L5-S1 2/3/23.  States he has a lot of pain. States he has regressed since he has not been able to go to PT. Admits to taking Gabapentin, Flexeril, Mobic, Oxycodone XR, and Tylenol.

## 2023-06-26 ENCOUNTER — APPOINTMENT (OUTPATIENT)
Dept: PAIN MANAGEMENT | Facility: CLINIC | Age: 43
End: 2023-06-26
Payer: OTHER MISCELLANEOUS

## 2023-06-26 VITALS — HEIGHT: 72 IN | WEIGHT: 270 LBS | BODY MASS INDEX: 36.57 KG/M2

## 2023-06-26 DIAGNOSIS — M79.18 MYALGIA, OTHER SITE: ICD-10-CM

## 2023-06-26 PROCEDURE — 99214 OFFICE O/P EST MOD 30 MIN: CPT | Mod: 25

## 2023-06-26 PROCEDURE — 20553 NJX 1/MLT TRIGGER POINTS 3/>: CPT

## 2023-06-27 PROBLEM — M79.18 MYOFASCIAL PAIN SYNDROME OF LUMBAR SPINE: Status: ACTIVE | Noted: 2023-06-27

## 2023-06-27 NOTE — WORK
[Patient] : patient [No Rx restrictions] : No Rx restrictions. [I provided the services listed above] :  I provided the services listed above. [Severe Partial] : severe partial [FreeTextEntry3] : Degree of impairment with regard to lumbar spine only

## 2023-06-27 NOTE — HISTORY OF PRESENT ILLNESS
[Lower back] : lower back [10] : 10 [8] : 8 [Radiating] : radiating [] : yes [Constant] : constant [Standing] : standing [Walking] : walking [FreeTextEntry1] : 6/26/2023 -  Patient presents for W/C reevaluation.  He was last seen over 1 year ago.  Referred by Dr. Abarca.  Since last visit patient has undergone laminectomy/ b/l foraminotomy at L4-S1 in June 2022 and subsequently L4-S1 PSF on 2/3/2023 with Dr. Abarca.  Patient c/o axial lower back pain/spasm which has been persistent since the surgery associated.  He reports 60% relief in right lower extremity radicular pain.  Current medication regimen includes Percocet 7.5/325, Flexeril 10 mg TID, Gabapentin 600 TID, and Mobic 15 mg daily.  Patient reports the Percocet is not completely working and feels he would need an increase in dosing to better control his pain.  Currently out of work, but he did return on 06/01/23.  Currently not participating in physical therapy due to reported authorization issues.  He reports a regression of his pain once physical therapy was discontinued.\par \par 05/10/22 - Patient presents for a WC FUV. Patient reports he was indicated for spine surgery surgery with Dr. Abarca and plans to move forward on 05/31/22. Patient reports minimal benefit with Gabapentin. Patient reports GI intolerance with NSAIDs. Patient presents today requesting rx therapies to offer relief in the interim. \par \par 2/21/22 - Patient presents for a FUV following a Right L5-S1 TFESI on 02/02/22. Patient reports no relief in his symptoms following his last injection. Patient reports that he has reconvened with Dr. Abarca who suggested proceeding with another MRI. Patient reports that driving exacerbates his pain. Patient has been taking Tylenol, gabapentin and flexural, finds mild relief with this combination, also experiencing dry mouth. \par \par 1/6/22 - Patient presents for initial evaluation. 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 He cannot any one specific event however. Denies any previous low back issues prior to this time. Patient c/o pain in his lower back, travelling in the right lower extremity focused on the right outer calf and top of the right foot. Patient was attending PT; however, he has not been able to gain approval to continue PT. \par \par W/c DOI: 3/26/21\par Occupation: [employer] \par Working Status: Light Duty\par \par Previous Injections: \par 1) Right L5-S1 TFESI (02/02/22)\par \par Pertinent Surgical History:\par 1) L4-S1 laminectomy (6/17/2022) - Dr. Abarca\par 2) L4-S1 revision laminectomy with PSF (2/3/2023) - Dr. Abarca\par \par Imaging:\par 1) Lumbar Spine MRI (3/7/22) - [facility]\par At L1-2: No significant spinal canal or neural foraminal stenosis.\par At L2-3: No significant spinal canal or neural foraminal stenosis.\par At L3-4: Broad-based left paracentral/foraminal disc herniation narrowing the left subarticular recess and moderately narrowing the adjacent foramen. The right neural foramen is patent. There is no significant spinal canal stenosis.\par At L4-5: Broad-based left paracentral/foraminal disc herniation effacing the ventral thecal sac without signal spinal canal stenosis. There is bilateral facet arthrosis contributing to moderate left-sided neural foraminal narrowing\par with abutment of the exiting L4 nerve root. The right neural foramen is patent.\par At L5-S1: Disc bulge with a superimposed broad-based left paracentral/foraminal disc herniation narrowing the left subarticular recess and severely narrowing the left neural foramen with impingement of the exiting L5 nerve root. There is mild bilateral facet arthrosis. The right neural foramen is patent. There is a central zone posterior annular fissure.\par \par Physician Disclaimer: I have personally reviewed and confirmed all HPI data with the patient. [FreeTextEntry7] : Right lateral calf, dorsal aspect of foot and ankle.

## 2023-06-27 NOTE — PHYSICAL EXAM
[de-identified] : Constitutional: \par - No acute distress \par - Well developed; well nourished \par \par Neurological: \par - normal mood and affect \par - alert and oriented x 3  \par \par Cardiovascular: \par - grossly normal\par \par Lumbar Spine Exam: \par \par Inspection: \par erythema (-) \par ecchymosis (-) \par rashes (-) \par alignment: no scoliosis\par well healed surgical incision \par \par Palpation:  \par paraspinal tenderness:                  L (+) ; R (+) \par thoracic paraspinal tenderness:    L (-) ; R (-) \par sciatic nerve tenderness :             L (-) ; R (-) \par SI joint tenderness:                        L (-) ; R (-) \par GTB tenderness:                            L (-);  R (-) \par \par ROM:   \par Full ROM with mild stiffness   \par Pain with extension and flexion \par \par Strength:                   Right       Left    \par Hip Flexion:                (5/5)       (5/5) \par Quadriceps:               (5/5)       (5/5) \par Hamstrings:                (5/5)       (5/5) \par Ankle Dorsiflexion:     (5/5)       (5/5) \par EHL:                            (5/5)       (5/5) \par Ankle Plantarflexion:  (5/5)       (5/5) \par \par \par Special Tests: \par SLR:                      R (-) ; L (-) \par Facet loading:       R (+) ; L (+) \par [place] test:          R (-) ; L (-) \par XSLR:                   R (-) ; L (-) \par Sarmad's test:        R (-) ; L(-) \par Tight Hamstrings   R (-) ; L (-) \par \par Neurologic: \par Light touch intact throughout LE \par Reflexes normal and symmetric \par \par Gait: \par non- antalgic gait

## 2023-06-27 NOTE — ASSESSMENT
[FreeTextEntry1] : A thorough discussion occurred regarding available pain management treatment options including interventional, rehabilitative, pharmacological, and alternative modalities with the patient. We will proceed with the following:\par \par Interventional treatment options:\par - Proceed with TPI today for lumbar myofascial pain component\par - Can repeat Q4-6 weeks for acute myofascial pain exacerbations\par - see additional instructions below\par \par Rehabilitative options:\par -Completed prior physical therapy trials with significant benefit\par -Patient awaiting authorization to resume PT as per orthopedics; he reports a significant regression of his pain and overall functionality since cessation of rehabilitative modalities\par \par Medication based treatment options:\par - continue gabapentin 600 mg TID ; titration schedule provided \par - continue meloxicam 15 mg daily; caution with consistent NSAID use secondary to GI distress\par - continue Tylenol 500-1000 mg up to TID on PRN basis\par - Encouraged down titration and cessation of opioid therapy as tolerated\par - see additional instructions below\par \par Complementary treatment options:\par - Weight management and lifestyle modifications discussed\par \par Additional treatment recommendations as follows:\par - Follow up with Dr. Abarca as directed\par - follow up in 6 weeks\par \par The risks, benefits and alternatives of the proposed procedure were explained in detail with the patient.  The risks outlined include, but are not limited to, infection, bleeding, nerve injury, a temporary increase in pain, failure to resolve symptoms, allergic reaction, and possible elevation of blood sugar in diabetics.  All questions were answered to patient's apparent satisfaction and he/she verbalized an understanding.\par \par We have discussed the risks, benefits, and alternatives NSAID therapy including but not limited to the risk of bleeding, thrombosis, gastric mucosal irritation/ulceration, allergic reaction and kidney dysfunction; the patient verbalizes an understanding.\par \par The documentation recorded by the scribe, in my presence, accurately reflects the service I personally performed and the decisions made by me with my edits as appropriate.\par \par I, Toribio MOLINA, personally performed the services described in this documentation incident to Roel Toribio DO.

## 2023-06-27 NOTE — PROCEDURE
[Bilateral] : bilaterally of the [Lumbar paraspinal muscle] : lumbar paraspinal muscle [___ cc    0.5%] : Bupivacaine (Marcaine) ~Vcc of 0.5%  [___ cc    40mg] : Triamcinolone (Kenalog) ~Vcc of 40 mg  [Apply ice for 15min out of every hour for the next 12-24 hours as tolerated] : apply ice for 15 minutes out of every hour for the next 12-24 hours as tolerated [Risks, benefits, alternatives discussed / Verbal consent obtained] : the risks benefits, and alternatives have been discussed, and verbal consent was obtained [Trigger point 3 or more muscle groups] : Trigger point 3 or more muscle groups [Thoracic paraspinal muscle] : thoracic paraspinal muscle [Gluteus Kesha muscle] : gluteus kesha muscle [Ethyl Chloride sprayed topically] : ethyl chloride sprayed topically [Sterile technique used] : sterile technique used [] : Patient tolerated procedure well [Call if redness, pain or fever occur] : call if redness, pain or fever occur

## 2023-06-28 ENCOUNTER — FORM ENCOUNTER (OUTPATIENT)
Age: 43
End: 2023-06-28

## 2023-06-28 RX ORDER — OXYCODONE AND ACETAMINOPHEN 7.5; 325 MG/1; MG/1
7.5-325 TABLET ORAL TWICE DAILY
Qty: 40 | Refills: 0 | Status: ACTIVE | COMMUNITY
Start: 2023-04-28 | End: 1900-01-01

## 2023-06-28 RX ORDER — CYCLOBENZAPRINE HYDROCHLORIDE 10 MG/1
10 TABLET, FILM COATED ORAL
Qty: 30 | Refills: 2 | Status: ACTIVE | COMMUNITY
Start: 2022-05-13 | End: 1900-01-01

## 2023-06-29 ENCOUNTER — FORM ENCOUNTER (OUTPATIENT)
Age: 43
End: 2023-06-29

## 2023-07-04 ENCOUNTER — FORM ENCOUNTER (OUTPATIENT)
Age: 43
End: 2023-07-04

## 2023-07-05 RX ORDER — GABAPENTIN 600 MG/1
600 TABLET, COATED ORAL
Qty: 90 | Refills: 2 | Status: ACTIVE | COMMUNITY
Start: 2022-05-10 | End: 1900-01-01

## 2023-07-06 ENCOUNTER — FORM ENCOUNTER (OUTPATIENT)
Age: 43
End: 2023-07-06

## 2023-07-10 ENCOUNTER — FORM ENCOUNTER (OUTPATIENT)
Age: 43
End: 2023-07-10

## 2023-07-11 RX ORDER — CYCLOBENZAPRINE HYDROCHLORIDE 10 MG/1
10 TABLET, FILM COATED ORAL 3 TIMES DAILY
Qty: 90 | Refills: 1 | Status: ACTIVE | COMMUNITY
Start: 2023-02-16 | End: 1900-01-01

## 2023-07-12 ENCOUNTER — FORM ENCOUNTER (OUTPATIENT)
Age: 43
End: 2023-07-12

## 2023-07-25 ENCOUNTER — FORM ENCOUNTER (OUTPATIENT)
Age: 43
End: 2023-07-25

## 2023-07-27 ENCOUNTER — APPOINTMENT (OUTPATIENT)
Dept: ORTHOPEDIC SURGERY | Facility: CLINIC | Age: 43
End: 2023-07-27
Payer: OTHER MISCELLANEOUS

## 2023-07-27 VITALS — WEIGHT: 270 LBS | HEIGHT: 72 IN | BODY MASS INDEX: 36.57 KG/M2

## 2023-07-27 PROCEDURE — 72100 X-RAY EXAM L-S SPINE 2/3 VWS: CPT

## 2023-07-27 PROCEDURE — 99215 OFFICE O/P EST HI 40 MIN: CPT | Mod: 25

## 2023-07-27 PROCEDURE — 20610 DRAIN/INJ JOINT/BURSA W/O US: CPT | Mod: LT

## 2023-07-27 NOTE — ASSESSMENT
[FreeTextEntry1] : We attempted to send patient back to work light duty.  however his physical therapy was not approved, and he regressed with worsening back pain and loss of motion.  Patient has difficulty standing more than 2 minutes at a time. \par \par Worker's compensation 100% temporarily disabled \par \par Continue using bone stimulator \par \par Recommend: - NSAID - Heating pad - Muscle relaxer - Core strengthening exercise - Hamstring stretching exercise Patient is given back rehabilitation exercise book. \par \par Follow up in 2 months

## 2023-07-27 NOTE — HISTORY OF PRESENT ILLNESS
[de-identified] : S/P Revision Laminectomy, Facetectomy, Instrumented Fusion L4-5, L5-S1 2/3/23. Pain today is 8/10. Patient states his pain is constant and severe. Patient states he has constant numbness in his left foot. Patient states there are times he is unable to bear weight on his left leg. Patient admits to using bone stimulator. Patient admits to taking Lyrica, Flexeril, Mobic, Oxycodone XR, and Tylenol. Patient admits to using Lidocaine patches for pain.

## 2023-08-11 RX ORDER — LIDOCAINE 5% 700 MG/1
5 PATCH TOPICAL
Qty: 1 | Refills: 2 | Status: ACTIVE | COMMUNITY
Start: 2023-07-12 | End: 1900-01-01

## 2023-08-21 RX ORDER — CYCLOBENZAPRINE HYDROCHLORIDE 10 MG/1
10 TABLET, FILM COATED ORAL 3 TIMES DAILY
Qty: 90 | Refills: 2 | Status: ACTIVE | COMMUNITY
Start: 2022-10-13 | End: 1900-01-01

## 2023-09-21 ENCOUNTER — APPOINTMENT (OUTPATIENT)
Dept: ORTHOPEDIC SURGERY | Facility: CLINIC | Age: 43
End: 2023-09-21
Payer: OTHER MISCELLANEOUS

## 2023-09-21 PROCEDURE — 99215 OFFICE O/P EST HI 40 MIN: CPT

## 2023-09-21 PROCEDURE — 72100 X-RAY EXAM L-S SPINE 2/3 VWS: CPT

## 2023-10-02 RX ORDER — METAXALONE 800 MG/1
800 TABLET ORAL 3 TIMES DAILY
Qty: 90 | Refills: 0 | Status: ACTIVE | COMMUNITY
Start: 2023-07-27 | End: 1900-01-01

## 2023-11-16 ENCOUNTER — RX CHANGE (OUTPATIENT)
Age: 43
End: 2023-11-16

## 2023-11-16 ENCOUNTER — APPOINTMENT (OUTPATIENT)
Dept: ORTHOPEDIC SURGERY | Facility: CLINIC | Age: 43
End: 2023-11-16
Payer: OTHER MISCELLANEOUS

## 2023-11-16 PROCEDURE — 99214 OFFICE O/P EST MOD 30 MIN: CPT

## 2023-11-16 RX ORDER — CYCLOBENZAPRINE HYDROCHLORIDE 10 MG/1
10 TABLET, FILM COATED ORAL 3 TIMES DAILY
Qty: 90 | Refills: 1 | Status: ACTIVE | COMMUNITY
Start: 2023-04-28 | End: 1900-01-01

## 2023-11-16 RX ORDER — DOCUSATE SODIUM 100 MG/1
100 CAPSULE ORAL TWICE DAILY
Qty: 60 | Refills: 0 | Status: ACTIVE | COMMUNITY
Start: 2023-11-16 | End: 1900-01-01

## 2023-11-16 RX ORDER — MELOXICAM 7.5 MG/1
7.5 TABLET ORAL DAILY
Qty: 60 | Refills: 2 | Status: ACTIVE | COMMUNITY
Start: 2023-04-12 | End: 1900-01-01

## 2023-11-24 RX ORDER — PREGABALIN 75 MG/1
75 CAPSULE ORAL
Qty: 60 | Refills: 0 | Status: ACTIVE | COMMUNITY
Start: 2023-05-31 | End: 1900-01-01

## 2023-11-28 ENCOUNTER — APPOINTMENT (OUTPATIENT)
Dept: ORTHOPEDIC SURGERY | Facility: CLINIC | Age: 43
End: 2023-11-28
Payer: COMMERCIAL

## 2023-11-28 VITALS — WEIGHT: 270 LBS | HEIGHT: 72 IN | BODY MASS INDEX: 36.57 KG/M2

## 2023-11-28 DIAGNOSIS — M25.872 OTHER SPECIFIED JOINT DISORDERS, LEFT ANKLE AND FOOT: ICD-10-CM

## 2023-11-28 DIAGNOSIS — M25.871 OTHER SPECIFIED JOINT DISORDERS, RIGHT ANKLE AND FOOT: ICD-10-CM

## 2023-11-28 PROCEDURE — 99203 OFFICE O/P NEW LOW 30 MIN: CPT | Mod: 25

## 2023-11-28 PROCEDURE — 20605 DRAIN/INJ JOINT/BURSA W/O US: CPT | Mod: RT

## 2023-11-28 PROCEDURE — 73610 X-RAY EXAM OF ANKLE: CPT | Mod: 50

## 2024-01-09 ENCOUNTER — APPOINTMENT (OUTPATIENT)
Dept: ORTHOPEDIC SURGERY | Facility: CLINIC | Age: 44
End: 2024-01-09

## 2024-01-11 ENCOUNTER — APPOINTMENT (OUTPATIENT)
Dept: ORTHOPEDIC SURGERY | Facility: CLINIC | Age: 44
End: 2024-01-11
Payer: OTHER MISCELLANEOUS

## 2024-01-11 PROCEDURE — 72100 X-RAY EXAM L-S SPINE 2/3 VWS: CPT

## 2024-01-11 PROCEDURE — 99214 OFFICE O/P EST MOD 30 MIN: CPT

## 2024-01-11 NOTE — HISTORY OF PRESENT ILLNESS
[de-identified] : Follow up lumbar spine. Patient states he has constant pain radiating down bilateral legs, has numbness/tingling. Patient admits to finishing PT with no relief. Patient admits to taking MRs, Percocet, Tylenol and Lyrica for pain.   Today's Pain with walking 8-9/10

## 2024-01-11 NOTE — ASSESSMENT
[FreeTextEntry1] : MRI L-Spine 8/2023 Fusion L4-S1 intact, no stenosis noted L3-4 L foraminal stenosis due to mild facet arthropathy  Patient presents today for follow up on his lumbar spine, S/P Revision Laminectomy, Facetectomy, Instrumented Fusion L4-5, L5-S1 2/3/23. Patient having persistent pain in his legs and back. Saw Dr. Bear for ankles, workup negative. Patient had EMG with Dr. Lagos's office 2 days ago, will get reports when available. Discussed with patient that a CT is warranted at this time to evaluate the fusion mass as well as further evaluate for cause of persistent symptoms. If patient has persistent radiculopathy and evidence of such on the EMG, may consider SCS trail for relief in symptoms.   - Patient given prescription for CT, follow up after study is completed to discuss results.   - Recommend physical therapy to regain range of motion, strengthening and symptomatic improvement. Prescription given in office today.   - Follow up on EMG report  - Recommend NSAIDs PRN - Recommend heating pad use to decrease muscle spasm - Discussed the importance of home exercises, including but not limited to hamstring stretching and core strengthening  Patient was educated on their diagnosis today. All questions answered and patient expressed understanding.  Follow up after CT  Worker's compensation 100% temporarily disabled. We attempted to send patient back to work light duty. however his physical therapy was not approved, and he regressed with worsening back pain and loss of motion. Patient has difficulty standing more than 2 minutes at a time.

## 2024-01-11 NOTE — REASON FOR VISIT
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 Revision Laminectomy, Facetectomy, Instrumented Fusion L4-5, L5-S1 2/3/23

## 2024-03-21 ENCOUNTER — APPOINTMENT (OUTPATIENT)
Dept: ORTHOPEDIC SURGERY | Facility: CLINIC | Age: 44
End: 2024-03-21

## 2024-04-18 ENCOUNTER — APPOINTMENT (OUTPATIENT)
Dept: ORTHOPEDIC SURGERY | Facility: CLINIC | Age: 44
End: 2024-04-18
Payer: OTHER MISCELLANEOUS

## 2024-04-18 DIAGNOSIS — M48.07 SPINAL STENOSIS, LUMBOSACRAL REGION: ICD-10-CM

## 2024-04-18 DIAGNOSIS — M54.16 RADICULOPATHY, LUMBAR REGION: ICD-10-CM

## 2024-04-18 DIAGNOSIS — S32.009K UNSPECIFIED FRACTURE OF UNSPECIFIED LUMBAR VERTEBRA, SUBSEQUENT ENCOUNTER FOR FRACTURE WITH NONUNION: ICD-10-CM

## 2024-04-18 DIAGNOSIS — M70.62 TROCHANTERIC BURSITIS, LEFT HIP: ICD-10-CM

## 2024-04-18 DIAGNOSIS — M47.817 SPONDYLOSIS W/OUT MYELOPATHY OR RADICULOPATHY, LUMBOSACRAL REGION: ICD-10-CM

## 2024-04-18 DIAGNOSIS — M51.36 OTHER INTERVERTEBRAL DISC DEGENERATION, LUMBAR REGION: ICD-10-CM

## 2024-04-18 DIAGNOSIS — Z98.1 ARTHRODESIS STATUS: ICD-10-CM

## 2024-04-18 DIAGNOSIS — M51.37 OTHER INTERVERTEBRAL DISC DEGENERATION, LUMBOSACRAL REGION: ICD-10-CM

## 2024-04-18 PROCEDURE — 99214 OFFICE O/P EST MOD 30 MIN: CPT

## 2024-04-18 NOTE — HISTORY OF PRESENT ILLNESS
[de-identified] : Follow up lumbar CT. Pt reports no changes since last visit States WC denied therapy Reports taking MRs, Percocet 7.5mg TID, Tylenol and Lyrica 100 BID with relief.    Today's Pain with walking 8-9/10

## 2024-04-18 NOTE — ASSESSMENT
[FreeTextEntry1] : MRI L-Spine 8/2023 Fusion L4-S1 intact, no stenosis noted L3-4 L foraminal stenosis due to mild facet arthropathy  3/2024 CT of L-Spine was reviewed today and is as follows:  Fusion L4-5, L5-S1  Bulging disc L3-4 with mild stenosis  Facet arthropathy left L5-S1  Patient presents today for follow up on his lumbar spine, S/P Revision Laminectomy, Facetectomy, Instrumented Fusion L4-5, L5-S1 2/3/23. CT detailed above shows solid fusion with some mild stenosis above and osteophytes on the left at L5-S1. I discussed with patient that he may benefit from MINI on L3-4 given persistent low back pain and radiculopathy.   - Referral to pain management for L3-4 MINI, follow up 2 weeks after injection.    - Patient will continue HEP as detailed in office. Emphasized daily stretching and strengthening.   - Recommend NSAIDs PRN - Recommend heating pad use to decrease muscle spasm - Discussed the importance of home exercises, including but not limited to hamstring stretching and core strengthening  Patient was educated on their diagnosis today. All questions answered and patient expressed understanding.  Follow up in 2 months  Worker's compensation 100% temporarily disabled. We attempted to send patient back to work light duty. however his physical therapy was not approved, and he regressed with worsening back pain and loss of motion. Patient has difficulty standing more than 2 minutes at a time.

## 2024-04-18 NOTE — DATA REVIEWED
[CT Scan] : CT scan [Lumbar Spine] : lumbar spine [I independently reviewed and interpreted images and report] : I independently reviewed and interpreted images and report [FreeTextEntry1] : 3/2024 CT of L-Spine was reviewed today and is as follows:  Fusion L4-5, L5-S1  Bulging disc L3-4 with mild stenosis  Facet arthropathy left L5-S1

## 2024-07-18 ENCOUNTER — APPOINTMENT (OUTPATIENT)
Dept: ORTHOPEDIC SURGERY | Facility: CLINIC | Age: 44
End: 2024-07-18
Payer: OTHER MISCELLANEOUS

## 2024-07-18 DIAGNOSIS — M70.62 TROCHANTERIC BURSITIS, LEFT HIP: ICD-10-CM

## 2024-07-18 DIAGNOSIS — M51.36 OTHER INTERVERTEBRAL DISC DEGENERATION, LUMBAR REGION: ICD-10-CM

## 2024-07-18 DIAGNOSIS — M48.07 SPINAL STENOSIS, LUMBOSACRAL REGION: ICD-10-CM

## 2024-07-18 DIAGNOSIS — Z98.1 ARTHRODESIS STATUS: ICD-10-CM

## 2024-07-18 DIAGNOSIS — M51.37 OTHER INTERVERTEBRAL DISC DEGENERATION, LUMBOSACRAL REGION: ICD-10-CM

## 2024-07-18 DIAGNOSIS — M54.16 RADICULOPATHY, LUMBAR REGION: ICD-10-CM

## 2024-07-18 DIAGNOSIS — Z98.890 OTHER SPECIFIED POSTPROCEDURAL STATES: ICD-10-CM

## 2024-07-18 DIAGNOSIS — M47.817 SPONDYLOSIS W/OUT MYELOPATHY OR RADICULOPATHY, LUMBOSACRAL REGION: ICD-10-CM

## 2024-07-18 DIAGNOSIS — S32.009K UNSPECIFIED FRACTURE OF UNSPECIFIED LUMBAR VERTEBRA, SUBSEQUENT ENCOUNTER FOR FRACTURE WITH NONUNION: ICD-10-CM

## 2024-07-18 PROCEDURE — 99214 OFFICE O/P EST MOD 30 MIN: CPT

## 2024-10-16 ENCOUNTER — APPOINTMENT (OUTPATIENT)
Dept: ORTHOPEDIC SURGERY | Facility: CLINIC | Age: 44
End: 2024-10-16